# Patient Record
Sex: FEMALE | Race: WHITE | Employment: FULL TIME | URBAN - NONMETROPOLITAN AREA
[De-identification: names, ages, dates, MRNs, and addresses within clinical notes are randomized per-mention and may not be internally consistent; named-entity substitution may affect disease eponyms.]

---

## 2022-08-01 ENCOUNTER — OFFICE VISIT (OUTPATIENT)
Dept: PRIMARY CARE CLINIC | Age: 22
End: 2022-08-01

## 2022-08-01 VITALS
HEIGHT: 62 IN | WEIGHT: 132 LBS | DIASTOLIC BLOOD PRESSURE: 68 MMHG | RESPIRATION RATE: 18 BRPM | OXYGEN SATURATION: 98 % | SYSTOLIC BLOOD PRESSURE: 114 MMHG | BODY MASS INDEX: 24.29 KG/M2 | HEART RATE: 97 BPM

## 2022-08-01 DIAGNOSIS — L25.5 PLANT DERMATITIS: Primary | ICD-10-CM

## 2022-08-01 RX ORDER — LEVONORGESTREL 52 MG/1
1 INTRAUTERINE DEVICE INTRAUTERINE ONCE
COMMUNITY

## 2022-08-01 NOTE — PROGRESS NOTES
raised red areas on B forearms. Lesions itch, not blistered and are not centrally cleared. Neurological:      Mental Status: She is alert. Additional Information:    /68   Pulse 97   Resp 18   Ht 5' 2\" (1.575 m)   Wt 132 lb (59.9 kg)   SpO2 98%   BMI 24.14 kg/m²     An electronic signature was used to authenticate this note.     --Carina Izaguirre, MARK - CNP

## 2022-08-01 NOTE — PATIENT INSTRUCTIONS
Continue the over the counter methods for dermatitis: plant  If you decide on the medrol steroid pack call

## 2023-04-26 ENCOUNTER — OFFICE VISIT (OUTPATIENT)
Dept: PRIMARY CARE CLINIC | Age: 23
End: 2023-04-26

## 2023-04-26 VITALS
RESPIRATION RATE: 16 BRPM | TEMPERATURE: 98.4 F | HEART RATE: 70 BPM | BODY MASS INDEX: 24.29 KG/M2 | OXYGEN SATURATION: 99 % | HEIGHT: 62 IN | WEIGHT: 132 LBS

## 2023-04-26 DIAGNOSIS — R05.1 ACUTE COUGH: Primary | ICD-10-CM

## 2023-04-26 DIAGNOSIS — J02.9 SORE THROAT: ICD-10-CM

## 2023-04-26 DIAGNOSIS — J06.9 VIRAL UPPER RESPIRATORY TRACT INFECTION: ICD-10-CM

## 2023-04-26 LAB — S PYO AG THROAT QL: NORMAL

## 2023-04-26 RX ORDER — BROMPHENIRAMINE MALEATE, PSEUDOEPHEDRINE HYDROCHLORIDE, AND DEXTROMETHORPHAN HYDROBROMIDE 2; 30; 10 MG/5ML; MG/5ML; MG/5ML
10 SYRUP ORAL 3 TIMES DAILY PRN
Qty: 118 ML | Refills: 0 | Status: SHIPPED | OUTPATIENT
Start: 2023-04-26 | End: 2023-04-30

## 2023-04-26 ASSESSMENT — ENCOUNTER SYMPTOMS
COUGH: 1
SHORTNESS OF BREATH: 0
SORE THROAT: 1
SINUS PRESSURE: 0
SWOLLEN GLANDS: 0
FACIAL SWELLING: 0
SINUS PAIN: 0
TROUBLE SWALLOWING: 0
NAUSEA: 0
VOMITING: 0

## 2023-04-26 NOTE — PATIENT INSTRUCTIONS
Try allergy pill daily  The cough med has 3 in one: decongestant, cough Supressant and cough expectorant in it. May make    Try salt water rinse and gargle 3 x day for throat  Come back if not improving or woorse symptoms strep today was negative.     sleepy

## 2023-04-26 NOTE — PROGRESS NOTES
[address]  Dept: [phone]  Dept Fax: [phone]  Loc Appt: [phone]  Loc Fax: [phone]    Charissa Leary is a 24 y.o. female who presents today for her medical conditions/complaints as noted below. Chief Complaint   Patient presents with    Pharyngitis     Initially started week ago with a cough, progressed into sinus congestion and sore throat. HPI:     Mounika presents with acute cough and sore throat  Cough progressively worsening to gagging at times  Reports sore at night with PND  Clear drainage  Throat feels raw  No fever chills etc see additional ROS/HPI    Pharyngitis  This is a new problem. The current episode started in the past 7 days. The problem occurs constantly. The problem has been gradually worsening. Associated symptoms include congestion, coughing and a sore throat. Pertinent negatives include no chills, fatigue, fever, headaches, joint swelling, myalgias, nausea, numbness, rash, swollen glands, vertigo, vomiting or weakness. The symptoms are aggravated by coughing. She has tried nothing for the symptoms. Current Outpatient Medications   Medication Sig Dispense Refill    brompheniramine-pseudoephedrine-DM 2-30-10 MG/5ML syrup Take 10 mLs by mouth 3 times daily as needed for Congestion or Cough (may make drowsy) 118 mL 0    levonorgestrel (MIRENA, 52 MG,) IUD 52 mg 1 each by IntraUTERine route once (Patient not taking: Reported on 4/26/2023)       No current facility-administered medications for this visit. Allergies   Allergen Reactions    Augmentin [Amoxicillin-Pot Clavulanate] Hives       Subjective:      Review of Systems   Constitutional:  Negative for chills, fatigue and fever. HENT:  Positive for congestion, postnasal drip and sore throat. Negative for ear pain, facial swelling, mouth sores, sinus pressure, sinus pain and trouble swallowing.